# Patient Record
Sex: FEMALE | Race: WHITE | NOT HISPANIC OR LATINO | URBAN - METROPOLITAN AREA
[De-identification: names, ages, dates, MRNs, and addresses within clinical notes are randomized per-mention and may not be internally consistent; named-entity substitution may affect disease eponyms.]

---

## 2018-07-02 ENCOUNTER — TELEPHONE (OUTPATIENT)
Dept: OBGYN CLINIC | Facility: HOSPITAL | Age: 43
End: 2018-07-02

## 2018-07-02 NOTE — TELEPHONE ENCOUNTER
Patient is calling stating that she had surgery with dr Oscar Leonard in 2015 and she has a court case where she needs to prove she had the surgery  I don't see anything in her records?

## 2018-07-02 NOTE — TELEPHONE ENCOUNTER
Megan or Janene Jenkins would you be so kind to logon to [facility] and print the Operative Note from 2015?